# Patient Record
Sex: FEMALE
[De-identification: names, ages, dates, MRNs, and addresses within clinical notes are randomized per-mention and may not be internally consistent; named-entity substitution may affect disease eponyms.]

---

## 2022-12-13 ENCOUNTER — NURSE TRIAGE (OUTPATIENT)
Dept: OTHER | Facility: CLINIC | Age: 67
End: 2022-12-13

## 2022-12-14 NOTE — TELEPHONE ENCOUNTER
Location of patient: [place]    Subjective: Caller states Tested positive for Covid, 102.4 temp, tired, slight cough, took a sleep aid, rested most of the day. Feels good otherwise. Current Symptoms: fatigue, cough, fever    Associated Symptoms: increased sleepiness    Pain Severity: 0/10; N/A; none    Temperature: 102 by unknown method    What has been tried: sleep aid    Recommended disposition: Roby Barth 0461 advice provided, patient verbalizes understanding; denies any other questions or concerns. Outcome:   Will follow up with PCP for possible antiviral therapies      Reason for Disposition   [1] COVID-19 diagnosed by positive lab test (e.g., PCR, rapid self-test kit) AND [2] mild symptoms (e.g., cough, fever, others) AND [9] no complications or SOB    Protocols used: Coronavirus (COVID-19) Diagnosed or Suspected-ADULT-

## 2023-02-14 ENCOUNTER — NURSE TRIAGE (OUTPATIENT)
Dept: OTHER | Facility: CLINIC | Age: 68
End: 2023-02-14

## 2023-02-14 NOTE — TELEPHONE ENCOUNTER
Location of patient: [place]    Subjective: Caller states \"Post op medication question\"     Current Symptoms:   Had surgery yesterday and was prescribed heparin for DVT prevention - pharmacy doesn't have it available. Spouse calling to see what they can do in the interim. Recommended disposition:   RN advised to reach out to surgeon    Care advice provided, patient verbalizes understanding; denies any other questions or concerns.     This triage is a result of a call to the [address] [place] [address]    Reason for Disposition   [1] Caller has URGENT medicine question about med that PCP or specialist prescribed AND [2] triager unable to answer question    Protocols used: Medication Question Call-ADULT-